# Patient Record
Sex: MALE | Race: BLACK OR AFRICAN AMERICAN | URBAN - METROPOLITAN AREA
[De-identification: names, ages, dates, MRNs, and addresses within clinical notes are randomized per-mention and may not be internally consistent; named-entity substitution may affect disease eponyms.]

---

## 2017-12-12 ENCOUNTER — OFFICE VISIT (OUTPATIENT)
Dept: FAMILY MEDICINE CLINIC | Facility: CLINIC | Age: 37
End: 2017-12-12

## 2017-12-12 VITALS
HEART RATE: 87 BPM | TEMPERATURE: 99 F | HEIGHT: 68.94 IN | BODY MASS INDEX: 30.12 KG/M2 | WEIGHT: 203.38 LBS | RESPIRATION RATE: 17 BRPM | SYSTOLIC BLOOD PRESSURE: 126 MMHG | DIASTOLIC BLOOD PRESSURE: 82 MMHG

## 2017-12-12 DIAGNOSIS — H61.23 BILATERAL IMPACTED CERUMEN: ICD-10-CM

## 2017-12-12 DIAGNOSIS — R05.9 COUGH: Primary | ICD-10-CM

## 2017-12-12 PROCEDURE — 69210 REMOVE IMPACTED EAR WAX UNI: CPT | Performed by: FAMILY MEDICINE

## 2017-12-12 PROCEDURE — 99202 OFFICE O/P NEW SF 15 MIN: CPT | Performed by: FAMILY MEDICINE

## 2017-12-12 PROCEDURE — 99212 OFFICE O/P EST SF 10 MIN: CPT | Performed by: FAMILY MEDICINE

## 2017-12-12 NOTE — PROGRESS NOTES
HPI:    Patient ID: Cheri Perkins is a 42year old male. Cough   This is a chronic problem. The current episode started more than 1 month ago. The problem has been waxing and waning. The cough is non-productive.  Pertinent negatives include no chest pain, removal.    No orders of the defined types were placed in this encounter.       Meds This Visit:  No prescriptions requested or ordered in this encounter    Imaging & Referrals:  None       [facility]#[id number]

## 2017-12-12 NOTE — PROCEDURES
Cerumen removed bilaterally with irrigation and curette. Right TM not visualized, residual cerumen unable to remove.   Left TM normal.  Visualized with otoscope, normal. Tolerated well

## 2017-12-15 ENCOUNTER — OFFICE VISIT (OUTPATIENT)
Dept: FAMILY MEDICINE CLINIC | Facility: CLINIC | Age: 37
End: 2017-12-15

## 2017-12-15 VITALS
DIASTOLIC BLOOD PRESSURE: 81 MMHG | BODY MASS INDEX: 29.89 KG/M2 | RESPIRATION RATE: 17 BRPM | HEART RATE: 70 BPM | HEIGHT: 68.94 IN | WEIGHT: 201.81 LBS | TEMPERATURE: 98 F | SYSTOLIC BLOOD PRESSURE: 125 MMHG

## 2017-12-15 DIAGNOSIS — H61.21 RIGHT EAR IMPACTED CERUMEN: ICD-10-CM

## 2017-12-15 DIAGNOSIS — R05.9 COUGH: Primary | ICD-10-CM

## 2017-12-15 PROCEDURE — 99212 OFFICE O/P EST SF 10 MIN: CPT | Performed by: FAMILY MEDICINE

## 2017-12-15 NOTE — PROGRESS NOTES
HPI:    Patient ID: Taqueria Aguilar is a 42year old male. Ear Problem    There is pain in the right ear. The current episode started more than 1 month ago. The problem has been gradually improving. There has been no fever.        Review of Systems

## 2019-12-10 ENCOUNTER — OFFICE VISIT (OUTPATIENT)
Dept: FAMILY MEDICINE CLINIC | Facility: CLINIC | Age: 39
End: 2019-12-10
Payer: COMMERCIAL

## 2019-12-10 VITALS
BODY MASS INDEX: 31 KG/M2 | HEART RATE: 99 BPM | RESPIRATION RATE: 18 BRPM | DIASTOLIC BLOOD PRESSURE: 74 MMHG | WEIGHT: 206.38 LBS | SYSTOLIC BLOOD PRESSURE: 113 MMHG | TEMPERATURE: 97 F

## 2019-12-10 DIAGNOSIS — R40.0 SOMNOLENCE: ICD-10-CM

## 2019-12-10 DIAGNOSIS — R06.83 SNORING: Primary | ICD-10-CM

## 2019-12-10 PROCEDURE — 99214 OFFICE O/P EST MOD 30 MIN: CPT | Performed by: FAMILY MEDICINE

## 2019-12-10 NOTE — PROGRESS NOTES
HPI:    Patient ID: Eliazar Elder is a 37year old male. Snoring   This is a new problem. The current episode started more than 1 month ago. The problem occurs intermittently. The problem has been waxing and waning.  Pertinent negatives include no chest p

## 2019-12-10 NOTE — PATIENT INSTRUCTIONS
Restart Flonase 2 puffs each nostril once a day  Breathe Right strips  Sleeping sure with tennis ball pocket on back  Aim for 15 pound weight loss  Avoid alcohol, sedating medication.   If no improvement schedule sleep study

## 2019-12-27 ENCOUNTER — TELEPHONE (OUTPATIENT)
Dept: CASE MANAGEMENT | Age: 39
End: 2019-12-27

## 2019-12-27 DIAGNOSIS — R40.0 SOMNOLENCE: ICD-10-CM

## 2019-12-27 DIAGNOSIS — R06.83 SNORING: Primary | ICD-10-CM

## 2019-12-27 NOTE — TELEPHONE ENCOUNTER
Dr. Regis Lau,    I am working on the diagnostic sleep study you ordered for [facility]. This has been denied by his insurance company. They will approve an at home sleep study.   If you agree, please submit a referral for an at home sleep study and please a